# Patient Record
Sex: FEMALE | Race: WHITE | NOT HISPANIC OR LATINO | Employment: FULL TIME | URBAN - METROPOLITAN AREA
[De-identification: names, ages, dates, MRNs, and addresses within clinical notes are randomized per-mention and may not be internally consistent; named-entity substitution may affect disease eponyms.]

---

## 2022-01-07 ENCOUNTER — HOSPITAL ENCOUNTER (EMERGENCY)
Facility: HOSPITAL | Age: 22
Discharge: HOME | End: 2022-01-08
Attending: EMERGENCY MEDICINE | Admitting: EMERGENCY MEDICINE
Payer: COMMERCIAL

## 2022-01-07 DIAGNOSIS — T50.Z95A VACCINE REACTION, INITIAL ENCOUNTER: ICD-10-CM

## 2022-01-07 DIAGNOSIS — F41.0 PANIC ATTACK: ICD-10-CM

## 2022-01-07 LAB
BASOPHILS # BLD AUTO: 0 10E3/UL (ref 0–0.2)
BASOPHILS NFR BLD AUTO: 0 %
EOSINOPHIL # BLD AUTO: 0 10E3/UL (ref 0–0.7)
EOSINOPHIL NFR BLD AUTO: 0 %
ERYTHROCYTE [DISTWIDTH] IN BLOOD BY AUTOMATED COUNT: 12.3 % (ref 10–15)
HCT VFR BLD AUTO: 36.9 % (ref 35–47)
HGB BLD-MCNC: 12.2 G/DL (ref 11.7–15.7)
IMM GRANULOCYTES # BLD: 0 10E3/UL
IMM GRANULOCYTES NFR BLD: 0 %
LYMPHOCYTES # BLD AUTO: 0.6 10E3/UL (ref 0.8–5.3)
LYMPHOCYTES NFR BLD AUTO: 5 %
MCH RBC QN AUTO: 29.3 PG (ref 26.5–33)
MCHC RBC AUTO-ENTMCNC: 33.1 G/DL (ref 31.5–36.5)
MCV RBC AUTO: 89 FL (ref 78–100)
MONOCYTES # BLD AUTO: 0.5 10E3/UL (ref 0–1.3)
MONOCYTES NFR BLD AUTO: 5 %
NEUTROPHILS # BLD AUTO: 9.8 10E3/UL (ref 1.6–8.3)
NEUTROPHILS NFR BLD AUTO: 90 %
NRBC # BLD AUTO: 0 10E3/UL
NRBC BLD AUTO-RTO: 0 /100
PLATELET # BLD AUTO: 260 10E3/UL (ref 150–450)
RBC # BLD AUTO: 4.17 10E6/UL (ref 3.8–5.2)
TROPONIN I SERPL-MCNC: <0.01 NG/ML (ref 0–0.29)
WBC # BLD AUTO: 10.9 10E3/UL (ref 4–11)

## 2022-01-07 PROCEDURE — 85025 COMPLETE CBC W/AUTO DIFF WBC: CPT | Performed by: PHYSICIAN ASSISTANT

## 2022-01-07 PROCEDURE — 93005 ELECTROCARDIOGRAM TRACING: CPT | Performed by: PHYSICIAN ASSISTANT

## 2022-01-07 PROCEDURE — 250N000013 HC RX MED GY IP 250 OP 250 PS 637: Performed by: PHYSICIAN ASSISTANT

## 2022-01-07 PROCEDURE — 36415 COLL VENOUS BLD VENIPUNCTURE: CPT | Performed by: PHYSICIAN ASSISTANT

## 2022-01-07 PROCEDURE — 84484 ASSAY OF TROPONIN QUANT: CPT | Performed by: PHYSICIAN ASSISTANT

## 2022-01-07 PROCEDURE — 84703 CHORIONIC GONADOTROPIN ASSAY: CPT | Performed by: PHYSICIAN ASSISTANT

## 2022-01-07 PROCEDURE — 99285 EMERGENCY DEPT VISIT HI MDM: CPT | Mod: 25

## 2022-01-07 PROCEDURE — 85379 FIBRIN DEGRADATION QUANT: CPT | Performed by: PHYSICIAN ASSISTANT

## 2022-01-07 RX ORDER — HYDROXYZINE HYDROCHLORIDE 25 MG/1
50 TABLET, FILM COATED ORAL ONCE
Status: COMPLETED | OUTPATIENT
Start: 2022-01-07 | End: 2022-01-07

## 2022-01-07 RX ADMIN — HYDROXYZINE HYDROCHLORIDE 50 MG: 25 TABLET, FILM COATED ORAL at 22:59

## 2022-01-07 ASSESSMENT — ENCOUNTER SYMPTOMS
GASTROINTESTINAL NEGATIVE: 1
MUSCULOSKELETAL NEGATIVE: 1
ENDOCRINE NEGATIVE: 1
SHORTNESS OF BREATH: 1
HEMATOLOGIC/LYMPHATIC NEGATIVE: 1
FATIGUE: 1

## 2022-01-07 NOTE — Clinical Note
Kim Tang was seen and treated in our emergency department on 1/7/2022.  She may return to work on 01/17/2022.  Seen in the emergency department.     If you have any questions or concerns, please don't hesitate to call.      Ev Hernandez MD

## 2022-01-08 ENCOUNTER — APPOINTMENT (OUTPATIENT)
Dept: CT IMAGING | Facility: HOSPITAL | Age: 22
End: 2022-01-08
Payer: COMMERCIAL

## 2022-01-08 VITALS
WEIGHT: 98 LBS | SYSTOLIC BLOOD PRESSURE: 101 MMHG | RESPIRATION RATE: 18 BRPM | DIASTOLIC BLOOD PRESSURE: 51 MMHG | OXYGEN SATURATION: 97 % | HEART RATE: 110 BPM | TEMPERATURE: 101.9 F

## 2022-01-08 LAB
ANION GAP SERPL CALCULATED.3IONS-SCNC: 11 MMOL/L (ref 5–18)
ATRIAL RATE - MUSE: 120 BPM
BUN SERPL-MCNC: 12 MG/DL (ref 8–22)
CALCIUM SERPL-MCNC: 9.1 MG/DL (ref 8.5–10.5)
CHLORIDE BLD-SCNC: 104 MMOL/L (ref 98–107)
CO2 SERPL-SCNC: 19 MMOL/L (ref 22–31)
CREAT SERPL-MCNC: 0.75 MG/DL (ref 0.6–1.1)
D DIMER PPP FEU-MCNC: 0.99 UG/ML FEU (ref 0–0.5)
DIASTOLIC BLOOD PRESSURE - MUSE: NORMAL MMHG
GFR SERPL CREATININE-BSD FRML MDRD: >90 ML/MIN/1.73M2
GLUCOSE BLD-MCNC: 100 MG/DL (ref 70–125)
HCG SERPL QL: NEGATIVE
INTERPRETATION ECG - MUSE: NORMAL
P AXIS - MUSE: NORMAL DEGREES
POTASSIUM BLD-SCNC: 3.4 MMOL/L (ref 3.5–5)
PR INTERVAL - MUSE: NORMAL MS
QRS DURATION - MUSE: 78 MS
QT - MUSE: 470 MS
QTC - MUSE: 650 MS
R AXIS - MUSE: -60 DEGREES
SODIUM SERPL-SCNC: 134 MMOL/L (ref 136–145)
SYSTOLIC BLOOD PRESSURE - MUSE: NORMAL MMHG
T AXIS - MUSE: -16 DEGREES
VENTRICULAR RATE- MUSE: 115 BPM

## 2022-01-08 PROCEDURE — 80048 BASIC METABOLIC PNL TOTAL CA: CPT | Performed by: PHYSICIAN ASSISTANT

## 2022-01-08 PROCEDURE — 250N000011 HC RX IP 250 OP 636: Performed by: EMERGENCY MEDICINE

## 2022-01-08 PROCEDURE — 250N000013 HC RX MED GY IP 250 OP 250 PS 637: Performed by: EMERGENCY MEDICINE

## 2022-01-08 PROCEDURE — 36415 COLL VENOUS BLD VENIPUNCTURE: CPT | Performed by: PHYSICIAN ASSISTANT

## 2022-01-08 PROCEDURE — 71275 CT ANGIOGRAPHY CHEST: CPT

## 2022-01-08 RX ORDER — IOPAMIDOL 755 MG/ML
100 INJECTION, SOLUTION INTRAVASCULAR ONCE
Status: DISCONTINUED | OUTPATIENT
Start: 2022-01-08 | End: 2022-01-08 | Stop reason: CLARIF

## 2022-01-08 RX ORDER — ACETAMINOPHEN 650 MG/1
650 SUPPOSITORY RECTAL ONCE
Status: DISCONTINUED | OUTPATIENT
Start: 2022-01-08 | End: 2022-01-08 | Stop reason: ALTCHOICE

## 2022-01-08 RX ORDER — ACETAMINOPHEN 325 MG/1
650 TABLET ORAL ONCE
Status: COMPLETED | OUTPATIENT
Start: 2022-01-08 | End: 2022-01-08

## 2022-01-08 RX ORDER — IOPAMIDOL 755 MG/ML
75 INJECTION, SOLUTION INTRAVASCULAR ONCE
Status: COMPLETED | OUTPATIENT
Start: 2022-01-08 | End: 2022-01-08

## 2022-01-08 RX ADMIN — IOPAMIDOL 75 ML: 755 INJECTION, SOLUTION INTRAVENOUS at 00:35

## 2022-01-08 RX ADMIN — ACETAMINOPHEN 650 MG: 325 TABLET ORAL at 01:26

## 2022-01-08 NOTE — ED NOTES
Patient noted to have a fever at discharge vitals, given Tylenol prior to discharge. Gave instructions on home dosing of Tylenol and ibuprofen for fever as needed.

## 2022-01-08 NOTE — ED PROVIDER NOTES
EMERGENCY DEPARTMENT SIGN OUT NOTE        ED COURSE AND MEDICAL DECISION MAKING  Patient was signed out to me by Sanchez Andrews PA-C at 12:17 AM    In brief, Kim Tang is a 21 year old female who presents to this ED for evaluation of acute onset of chest pain shortness of breath.  Symptoms began around 8 and 9 PM.  Gradually.  Pain is localized the left side of the sternum.  Described as a burning intermittent pain in nature.  Symptoms seem to cause shortness of breath.  No reports of cough, fever, chills.  No previous history of blood clots or clotting disorders.  She is a non-smoker and does not use birth control.  Patient did receive the J&J vaccine for COVID-19 this morning around 8 AM.    At time of sign out, disposition was pending CT Chest Pulmonary Embolism.    CT scan here without acute abnormalities.  Will discharge with follow-up with primary care.    FINAL IMPRESSION    1. Vaccine reaction, initial encounter    2. Panic attack        ED MEDS  Medications   hydrOXYzine (ATARAX) tablet 50 mg (50 mg Oral Given 1/7/22 6081)   iopamidol (ISOVUE-370) solution 75 mL (75 mLs Intravenous Given 1/8/22 0035)       LAB  Labs Ordered and Resulted from Time of ED Arrival to Time of ED Departure   D DIMER QUANTITATIVE - Abnormal       Result Value    D-Dimer Quantitative 0.99 (*)    BASIC METABOLIC PANEL - Abnormal    Sodium 134 (*)     Potassium 3.4 (*)     Chloride 104      Carbon Dioxide (CO2) 19 (*)     Anion Gap 11      Urea Nitrogen 12      Creatinine 0.75      Calcium 9.1      Glucose 100      GFR Estimate >90     CBC WITH PLATELETS AND DIFFERENTIAL - Abnormal    WBC Count 10.9      RBC Count 4.17      Hemoglobin 12.2      Hematocrit 36.9      MCV 89      MCH 29.3      MCHC 33.1      RDW 12.3      Platelet Count 260      % Neutrophils 90      % Lymphocytes 5      % Monocytes 5      % Eosinophils 0      % Basophils 0      % Immature Granulocytes 0      NRBCs per 100 WBC 0      Absolute Neutrophils 9.8 (*)      Absolute Lymphocytes 0.6 (*)     Absolute Monocytes 0.5      Absolute Eosinophils 0.0      Absolute Basophils 0.0      Absolute Immature Granulocytes 0.0      Absolute NRBCs 0.0     HCG QUALITATIVE PREGNANCY - Normal    hCG Serum Qualitative Negative     TROPONIN I - Normal    Troponin I <0.01         EKG      RADIOLOGY    CT Chest Pulmonary Embolism w Contrast   Final Result   IMPRESSION:   1.  There is no pulmonary embolus, aortic aneurysm or dissection. No acute abnormality.   2.  Two small indeterminate left lower lobe lung nodules.      Recommendations for one or multiple incidental lung nodules < 6mm :     Low risk patients: No routine follow-up.     High risk patients: Optional follow-up CT at 12 months; if unchanged, no further follow-up.      *Low Risk: Minimal or absent history of smoking or other known risk factors.   *Nonsolid (ground glass) or partly solid nodules may require longer follow-up to exclude indolent adenocarcinoma.   *Recommendations based on Guidelines for the Management of Incidental Pulmonary Nodules Detected at CT: From the Fleischner Society 2017, Radiology 2017.             DISCHARGE MEDS  New Prescriptions    No medications on file         Urszula Hernandez M.D.  Emergency Medicine  [facility] EMERGENCY DEPARTMENT  [facility] [facility] [address]  [phone]  Dept: [phone]     Ev Hernandez MD  01/08/22 0110

## 2022-01-08 NOTE — DISCHARGE INSTRUCTIONS
Thankfully your CT scan here did not show any acute abnormalities.  Please follow-up with your primary care.  Rest over the rest of the weekend, return for any new or worsening symptoms.

## 2022-01-08 NOTE — ED PROVIDER NOTES
EMERGENCY DEPARTMENT ENCOUNTER      NAME: Kim Tang  AGE: 21 year old female  YOB: 2000  MRN: [id number]  EVALUATION DATE & TIME: No admission date for patient encounter.    PCP: No primary care provider on file.    ED PROVIDER: Sanchez Andrews PA-C      Chief Complaint   Patient presents with     Chest Pain         FINAL IMPRESSION:  1. Vaccine reaction, initial encounter    2. Panic attack          MEDICAL DECISION MAKING:    Pertinent Labs & Imaging studies reviewed. (See chart for details)  21 year old female presents to the Emergency Department for evaluation of chest pain shortness of breath in the presence of having J&J COVID-19 vaccine this morning.    After obtaining history present illness, reviewing vitals and examining the patient plan to assess with labs which will include D-dimer and troponin, EKG, and some form of chest imaging based on dimer results.  Patient does seem like she is hyperventilating and quite anxious at this point time therefore I will treat with Vistaril and observe for improvement of her numbness and tingling as well as heart rate.    Patient was reassessed.  She is feeling improved less anxious, she is sleepy from the Vistaril.  Patient's D-dimer did come back just under one therefore we will further assess with CT scan of the chest.  Remainder of labs are unremarkable.    Patient signed out to Dr. Hernandez, with CT chest results and disposition pending.    ED COURSE    I met with the patient, obtained history, performed an initial exam, and discussed options and plan for diagnostics and treatment here in the ED.    At the conclusion of the encounter I discussed the results of all of the tests and the disposition. The questions were answered. The patient or family acknowledged understanding and was agreeable with the care plan.     MEDICATIONS GIVEN IN THE EMERGENCY:  Medications   hydrOXYzine (ATARAX) tablet 50 mg (50 mg Oral Given 1/7/22 9326)       NEW  PRESCRIPTIONS STARTED AT TODAY'S ER VISIT  New Prescriptions    No medications on file            =================================================================    HPI    Patient information was obtained from: Patient        Kim Tang is a 21 year old female who presents to this ED for evaluation of acute onset of chest pain shortness of breath.  Symptoms began around 8 and 9 PM.  Gradually.  Pain is localized the left side of the sternum.  Described as a burning intermittent pain in nature.  Symptoms seem to cause shortness of breath.  No reports of cough, fever, chills.  No previous history of blood clots or clotting disorders.  She is a non-smoker and does not use birth control.  Patient did receive the J&J vaccine for COVID-19 this morning around 8 AM.    Patient currently reporting numbness and tingling in legs feet hands and around the mouth.  No previous history of panic attack.  Otherwise no reports of past medical history that is significant.      REVIEW OF SYSTEMS   Review of Systems   Constitutional: Positive for fatigue.   HENT: Negative.    Respiratory: Positive for shortness of breath.    Cardiovascular: Positive for chest pain.   Gastrointestinal: Negative.    Endocrine: Negative.    Genitourinary: Negative.    Musculoskeletal: Negative.    Neurological:        Numbness tingling in hands and feet   Hematological: Negative.           PAST MEDICAL HISTORY:  No past medical history on file.    PAST SURGICAL HISTORY:  No past surgical history on file.      CURRENT MEDICATIONS:    No current facility-administered medications for this encounter.  No current outpatient medications on file.      ALLERGIES:  No Known Allergies    FAMILY HISTORY:  No family history on file.    SOCIAL HISTORY:   Social History     Socioeconomic History     Marital status: Single     Spouse name: Not on file     Number of children: Not on file     Years of education: Not on file     Highest education level: Not on file    Occupational History     Not on file   Tobacco Use     Smoking status: Not on file     Smokeless tobacco: Not on file   Substance and Sexual Activity     Alcohol use: Not on file     Drug use: Not on file     Sexual activity: Not on file   Other Topics Concern     Not on file   Social History Narrative     Not on file     Social Determinants of Health     Financial Resource Strain: Not on file   Food Insecurity: Not on file   Transportation Needs: Not on file   Physical Activity: Not on file   Stress: Not on file   Social Connections: Not on file   Intimate Partner Violence: Not on file   Housing Stability: Not on file       VITALS:  Patient Vitals for the past 24 hrs:   BP Temp Temp src Pulse Resp SpO2 Weight   01/07/22 2223 -- -- -- -- -- -- 44.5 kg (98 lb)   01/07/22 2219 123/73 97.5  F (36.4  C) Oral 110 18 100 % --       PHYSICAL EXAM    Physical Exam  Vitals and nursing note reviewed.   Constitutional:       General: She is in acute distress.      Appearance: She is normal weight. She is not ill-appearing or toxic-appearing.   HENT:      Head: Normocephalic.      Right Ear: External ear normal.      Left Ear: External ear normal.   Eyes:      Conjunctiva/sclera: Conjunctivae normal.   Cardiovascular:      Rate and Rhythm: Tachycardia present.      Pulses: Normal pulses.   Pulmonary:      Effort: Pulmonary effort is normal. No respiratory distress.      Breath sounds: No stridor. No wheezing or rhonchi.   Abdominal:      General: Abdomen is flat.      Tenderness: There is no abdominal tenderness.   Musculoskeletal:         General: Normal range of motion.   Skin:     General: Skin is warm and dry.   Neurological:      General: No focal deficit present.      Mental Status: She is alert. Mental status is at baseline.      Sensory: No sensory deficit.      Motor: No weakness.   Psychiatric:         Mood and Affect: Mood normal.          LAB:  All pertinent labs reviewed and interpreted.  Results for orders  placed or performed during the hospital encounter of 01/07/22   D dimer quantitative   Result Value Ref Range    D-Dimer Quantitative 0.99 (H) 0.00 - 0.50 ug/mL FEU   HCG QUALitative pregnancy (blood)   Result Value Ref Range    hCG Serum Qualitative Negative Negative   Troponin I (now)   Result Value Ref Range    Troponin I <0.01 0.00 - 0.29 ng/mL   CBC with platelets and differential   Result Value Ref Range    WBC Count 10.9 4.0 - 11.0 10e3/uL    RBC Count 4.17 3.80 - 5.20 10e6/uL    Hemoglobin 12.2 11.7 - 15.7 g/dL    Hematocrit 36.9 35.0 - 47.0 %    MCV 89 78 - 100 fL    MCH 29.3 26.5 - 33.0 pg    MCHC 33.1 31.5 - 36.5 g/dL    RDW 12.3 10.0 - 15.0 %    Platelet Count 260 150 - 450 10e3/uL    % Neutrophils 90 %    % Lymphocytes 5 %    % Monocytes 5 %    % Eosinophils 0 %    % Basophils 0 %    % Immature Granulocytes 0 %    NRBCs per 100 WBC 0 <1 /100    Absolute Neutrophils 9.8 (H) 1.6 - 8.3 10e3/uL    Absolute Lymphocytes 0.6 (L) 0.8 - 5.3 10e3/uL    Absolute Monocytes 0.5 0.0 - 1.3 10e3/uL    Absolute Eosinophils 0.0 0.0 - 0.7 10e3/uL    Absolute Basophils 0.0 0.0 - 0.2 10e3/uL    Absolute Immature Granulocytes 0.0 <=0.4 10e3/uL    Absolute NRBCs 0.0 10e3/uL       RADIOLOGY:  Reviewed all pertinent imaging. Please see official radiology report.  CT Chest Pulmonary Embolism w Contrast    (Results Pending)       EKG:    Performed at: 2229    EKG showing a sinus rhythm at a rate of 115 bpm.  ST segments normal with no obvious elevation or depression.  T waves appear flat.  QTC measured at 650.,  No previous EKG for comparison.    I have independently reviewed and interpreted the EKG(s) documented above.  Reviewed with Dr. СЕРГЕЙ Andrews PA-C  Emergency Medicine  [facility]     Sanchez Andrews PA-C  01/08/22 0017

## 2022-01-08 NOTE — ED TRIAGE NOTES
Pt comes in with chest pains. Pain started around 2045 this evening, pt has J&J vaccine this morning. Pt appears anxious. Some shoot pain down both legs.

## 2022-05-14 ENCOUNTER — HEALTH MAINTENANCE LETTER (OUTPATIENT)
Age: 22
End: 2022-05-14

## 2023-01-14 ENCOUNTER — HEALTH MAINTENANCE LETTER (OUTPATIENT)
Age: 23
End: 2023-01-14

## 2023-06-02 ENCOUNTER — HEALTH MAINTENANCE LETTER (OUTPATIENT)
Age: 23
End: 2023-06-02

## 2024-07-06 ENCOUNTER — HEALTH MAINTENANCE LETTER (OUTPATIENT)
Age: 24
End: 2024-07-06

## 2024-10-29 ENCOUNTER — TELEPHONE (OUTPATIENT)
Dept: OBGYN | Facility: CLINIC | Age: 24
End: 2024-10-29

## 2024-10-29 ENCOUNTER — APPOINTMENT (OUTPATIENT)
Dept: CT IMAGING | Facility: HOSPITAL | Age: 24
End: 2024-10-29
Payer: COMMERCIAL

## 2024-10-29 ENCOUNTER — HOSPITAL ENCOUNTER (EMERGENCY)
Facility: HOSPITAL | Age: 24
Discharge: HOME | End: 2024-10-29
Attending: STUDENT IN AN ORGANIZED HEALTH CARE EDUCATION/TRAINING PROGRAM | Admitting: STUDENT IN AN ORGANIZED HEALTH CARE EDUCATION/TRAINING PROGRAM
Payer: COMMERCIAL

## 2024-10-29 VITALS
BODY MASS INDEX: 14.32 KG/M2 | HEART RATE: 68 BPM | OXYGEN SATURATION: 99 % | HEIGHT: 70 IN | TEMPERATURE: 98.4 F | DIASTOLIC BLOOD PRESSURE: 64 MMHG | SYSTOLIC BLOOD PRESSURE: 97 MMHG | RESPIRATION RATE: 15 BRPM | WEIGHT: 100 LBS

## 2024-10-29 DIAGNOSIS — R10.31 RIGHT LOWER QUADRANT ABDOMINAL PAIN: ICD-10-CM

## 2024-10-29 LAB
ALBUMIN SERPL BCG-MCNC: 4.9 G/DL (ref 3.5–5.2)
ALBUMIN UR-MCNC: 10 MG/DL
ALP SERPL-CCNC: 70 U/L (ref 40–150)
ALT SERPL W P-5'-P-CCNC: 17 U/L (ref 0–50)
ANION GAP SERPL CALCULATED.3IONS-SCNC: 13 MMOL/L (ref 7–15)
APPEARANCE UR: CLEAR
AST SERPL W P-5'-P-CCNC: 17 U/L (ref 0–45)
BASOPHILS # BLD AUTO: 0 10E3/UL (ref 0–0.2)
BASOPHILS NFR BLD AUTO: 1 %
BILIRUB DIRECT SERPL-MCNC: <0.2 MG/DL (ref 0–0.3)
BILIRUB SERPL-MCNC: 0.3 MG/DL
BILIRUB UR QL STRIP: NEGATIVE
BUN SERPL-MCNC: 13.5 MG/DL (ref 6–20)
CALCIUM SERPL-MCNC: 9.4 MG/DL (ref 8.8–10.4)
CHLORIDE SERPL-SCNC: 106 MMOL/L (ref 98–107)
COLOR UR AUTO: ABNORMAL
CREAT SERPL-MCNC: 0.66 MG/DL (ref 0.51–0.95)
EGFRCR SERPLBLD CKD-EPI 2021: >90 ML/MIN/1.73M2
EOSINOPHIL # BLD AUTO: 0 10E3/UL (ref 0–0.7)
EOSINOPHIL NFR BLD AUTO: 0 %
ERYTHROCYTE [DISTWIDTH] IN BLOOD BY AUTOMATED COUNT: 12.8 % (ref 10–15)
GLUCOSE SERPL-MCNC: 93 MG/DL (ref 70–99)
GLUCOSE UR STRIP-MCNC: NEGATIVE MG/DL
HCG SERPL QL: NEGATIVE
HCO3 SERPL-SCNC: 21 MMOL/L (ref 22–29)
HCT VFR BLD AUTO: 39.9 % (ref 35–47)
HGB BLD-MCNC: 13.3 G/DL (ref 11.7–15.7)
HGB UR QL STRIP: NEGATIVE
IMM GRANULOCYTES # BLD: 0 10E3/UL
IMM GRANULOCYTES NFR BLD: 0 %
KETONES UR STRIP-MCNC: 20 MG/DL
LEUKOCYTE ESTERASE UR QL STRIP: NEGATIVE
LIPASE SERPL-CCNC: 56 U/L (ref 13–60)
LYMPHOCYTES # BLD AUTO: 2.6 10E3/UL (ref 0.8–5.3)
LYMPHOCYTES NFR BLD AUTO: 33 %
MCH RBC QN AUTO: 29.2 PG (ref 26.5–33)
MCHC RBC AUTO-ENTMCNC: 33.3 G/DL (ref 31.5–36.5)
MCV RBC AUTO: 88 FL (ref 78–100)
MONOCYTES # BLD AUTO: 0.5 10E3/UL (ref 0–1.3)
MONOCYTES NFR BLD AUTO: 6 %
MUCOUS THREADS #/AREA URNS LPF: PRESENT /LPF
NEUTROPHILS # BLD AUTO: 4.7 10E3/UL (ref 1.6–8.3)
NEUTROPHILS NFR BLD AUTO: 60 %
NITRATE UR QL: NEGATIVE
NRBC # BLD AUTO: 0 10E3/UL
NRBC BLD AUTO-RTO: 0 /100
PH UR STRIP: 6 [PH] (ref 5–7)
PLATELET # BLD AUTO: 290 10E3/UL (ref 150–450)
POTASSIUM SERPL-SCNC: 4.1 MMOL/L (ref 3.4–5.3)
PROT SERPL-MCNC: 7.6 G/DL (ref 6.4–8.3)
RBC # BLD AUTO: 4.55 10E6/UL (ref 3.8–5.2)
RBC URINE: 1 /HPF
SODIUM SERPL-SCNC: 140 MMOL/L (ref 135–145)
SP GR UR STRIP: 1.03 (ref 1–1.03)
SQUAMOUS EPITHELIAL: 1 /HPF
UROBILINOGEN UR STRIP-MCNC: <2 MG/DL
WBC # BLD AUTO: 7.8 10E3/UL (ref 4–11)
WBC URINE: <1 /HPF

## 2024-10-29 PROCEDURE — 85004 AUTOMATED DIFF WBC COUNT: CPT

## 2024-10-29 PROCEDURE — 250N000011 HC RX IP 250 OP 636

## 2024-10-29 PROCEDURE — 81003 URINALYSIS AUTO W/O SCOPE: CPT

## 2024-10-29 PROCEDURE — 96374 THER/PROPH/DIAG INJ IV PUSH: CPT | Mod: 59

## 2024-10-29 PROCEDURE — 36415 COLL VENOUS BLD VENIPUNCTURE: CPT

## 2024-10-29 PROCEDURE — 99285 EMERGENCY DEPT VISIT HI MDM: CPT | Mod: 25

## 2024-10-29 PROCEDURE — 96375 TX/PRO/DX INJ NEW DRUG ADDON: CPT

## 2024-10-29 PROCEDURE — 74177 CT ABD & PELVIS W/CONTRAST: CPT

## 2024-10-29 PROCEDURE — 82248 BILIRUBIN DIRECT: CPT

## 2024-10-29 PROCEDURE — 84703 CHORIONIC GONADOTROPIN ASSAY: CPT

## 2024-10-29 PROCEDURE — 83690 ASSAY OF LIPASE: CPT

## 2024-10-29 RX ORDER — ONDANSETRON 2 MG/ML
4 INJECTION INTRAMUSCULAR; INTRAVENOUS EVERY 30 MIN PRN
Status: DISCONTINUED | OUTPATIENT
Start: 2024-10-29 | End: 2024-10-29 | Stop reason: HOSPADM

## 2024-10-29 RX ORDER — IOPAMIDOL 755 MG/ML
60 INJECTION, SOLUTION INTRAVASCULAR ONCE
Status: COMPLETED | OUTPATIENT
Start: 2024-10-29 | End: 2024-10-29

## 2024-10-29 RX ORDER — KETOROLAC TROMETHAMINE 15 MG/ML
10 INJECTION, SOLUTION INTRAMUSCULAR; INTRAVENOUS ONCE
Status: COMPLETED | OUTPATIENT
Start: 2024-10-29 | End: 2024-10-29

## 2024-10-29 RX ADMIN — IOPAMIDOL 60 ML: 755 INJECTION, SOLUTION INTRAVENOUS at 13:35

## 2024-10-29 RX ADMIN — ONDANSETRON 4 MG: 2 INJECTION INTRAMUSCULAR; INTRAVENOUS at 12:30

## 2024-10-29 RX ADMIN — KETOROLAC TROMETHAMINE 10 MG: 15 INJECTION, SOLUTION INTRAMUSCULAR; INTRAVENOUS at 12:33

## 2024-10-29 ASSESSMENT — COLUMBIA-SUICIDE SEVERITY RATING SCALE - C-SSRS
2. HAVE YOU ACTUALLY HAD ANY THOUGHTS OF KILLING YOURSELF IN THE PAST MONTH?: NO
6. HAVE YOU EVER DONE ANYTHING, STARTED TO DO ANYTHING, OR PREPARED TO DO ANYTHING TO END YOUR LIFE?: NO
1. IN THE PAST MONTH, HAVE YOU WISHED YOU WERE DEAD OR WISHED YOU COULD GO TO SLEEP AND NOT WAKE UP?: NO

## 2024-10-29 ASSESSMENT — ACTIVITIES OF DAILY LIVING (ADL)
ADLS_ACUITY_SCORE: 0

## 2024-10-29 NOTE — ED PROVIDER NOTES
Emergency Department Encounter   NAME: Kim Tang ; AGE: 24 year old female ; YOB: 2000 ; MRN: [id number] ; PCP: No Ref-Primary, Physician   ED PROVIDER: Saray Murguia PA-C    Evaluation Date & Time:   10/29/2024 11:06 AM    CHIEF COMPLAINT:  Abdominal Pain      FINAL IMPRESSION:    ICD-10-CM    1. Right lower quadrant abdominal pain  R10.31 Ob/Gyn  Referral            IMPRESSION AND PLAN   MDM: Kim Tang is a 24 year old female with no known medical history who presents to the ED by walk-in for evaluation of RLQ pain that has been progressively worsening over the last 10 days. LMP 10/25/24. No history of abdominal surgeries.     Vitals stable. On exam patient is well-appearing an in no acute distress.  Abdomen is diffusely rigid and tender, most notably in the RUQ/RLQ/LLQ.  Mild suprapubic tenderness noted.  Tenderness over McBurney's point.  No rebound tenderness, no Rovsing sign.  Positive CVA tenderness on the right side, negative on the left side.  Differentials include appendicitis, diverticulitis, pyelonephritis, nephrolithiasis, UTI, ovarian cyst, endometriosis, pregnancy, STD, hepatobiliary etiology, pancreatitis.  Less concern for ovarian torsion given length of symptoms.     Patient given Toradol and Zofran for initial symptom management.  CBC without leukocytosis, hemoglobin stable.  BMP, hepatic function test both unremarkable.  Lipase at 56, not concerning for pancreatitis.  Pregnancy test negative.  Patient declines any changes in vaginal discharge, do not feel wet prep is necessary at this time.  UA without concerns for infectious etiology.  CT abdomen without evidence of acute appendicitis, no appreciable stones, hydronephrosis, gallstones to suggest cause of symptoms.  CT did note of small amount of free fluid in the pelvis which could be indicative of a ruptured ovarian cyst, since there is no other clear etiology to patient's symptoms.      Upon  reevaluation, patient is resting comfortably in her hospital bed and remains vitally stable.  She reports no change in symptoms with Toradol and Zofran.  Patient was informed of unremarkable blood work.  I also discussed with her that there was free fluid in her pelvic area found on the CT which could be signs of a ruptured ovarian cyst.  There is no treatment for this acutely as her symptoms will subside on her own.  Mom did inquire about endometriosis which is on my differential.  I highly recommended OB/GYN referral for additional evaluation and follow-up to which both mom and patient are amenable.  In the meantime, I recommended NSAIDs and heating pad to the area.  Patient is agreeable with this plan and feels comfortable discharge at this time.    Patient seen in conjunction with Dr. Johnson.    History:  Supplemental history from: Family Member/Significant Other  External Record(s) reviewed: Documented in chart    Work Up:  Chart documentation includes differential considered and any EKGs or imaging independently interpreted by provider, where specified.  In additional to work up documented, I considered the following work up: Documented in chart, if applicable.    External consultation:  Discussion of management with another provider: Documented in chart, if applicable    Complicating factors:  Care impacted by chronic illness: See HPI.  Care affected by social determinants of health: N/A    Disposition considerations: Discharge. No recommendations on prescription strength medication(s). See documentation for any additional details.  Not Applicable      Chart Review: See HPI.    ED COURSE:  12:11 PM  I met and introduced myself to the patient. I gathered initial history and performed my physical exam. We discussed plan for initial workup.   2:26 PM I have staffed the patient with Dr. Johnson, ED MD, who has evaluated the patient and agrees with all aspects of today's care.   2:43 PM I rechecked the patient and  discussed results, discharge, follow up, and reasons to return to the ED.           MEDICATIONS GIVEN IN THE EMERGENCY DEPARTMENT:  Medications   ketorolac (TORADOL) injection 10 mg (10 mg Intravenous $Given 10/29/24 5723)   iopamidol (ISOVUE-370) solution 60 mL (60 mLs Intravenous $Given 10/29/24 1335)         NEW PRESCRIPTIONS STARTED AT TODAY'S ED VISIT:  There are no discharge medications for this patient.        BRIEF HPI   Patient information was obtained from: Patient and Mother   Use of Intrepreter: N/A     Kim Tang is a 24 year old female who presents emergency department with RLQ pain.  Patient reports the pain began 10 days ago and has been gradually worsening since.  She describes the pain as sharp and crampy, similar to period cramps.  LMP 10/25/2024.  She does not currently use birth control, no IUD.  She has not taken any medications to manage her pain.  She was seen at urgent care earlier today.  Based on her exquisitely tender abdomen that was rigid to palpation, patient was sent immediately to the emergency department.  CBC was drawn at urgent care, no other labs ordered.  No imaging or urine ordered at urgent care either.  Patient also endorses some mild chills, nausea.  She otherwise denies fever, vomiting, urinary symptoms, diarrhea, constipation, change in vaginal discharge.  She has no concern for pregnancy or STDs at this time.    Patient reports last oral intake was this morning when she drinks water, no food as of yet today.  She also did have a bowel movement this morning which appeared normal, no hematochezia, melena or pain with defecation.  Patient has no history of abdominal surgeries.      REVIEW OF SYSTEMS:  Pertinent positive and negative symptoms per HPI.       MEDICAL HISTORY     No past medical history on file.    No past surgical history on file.    No family history on file.         No current outpatient medications on file.        PHYSICAL EXAM     First  "Vitals:  Patient Vitals for the past 24 hrs:   BP Temp Temp src Pulse Resp SpO2 Height Weight   10/29/24 1455 97/64 -- -- 68 15 99 % -- --   10/29/24 1431 102/68 -- -- 69 -- 99 % -- --   10/29/24 1402 -- -- -- 85 -- 100 % -- --   10/29/24 1337 96/54 -- -- 57 -- 100 % -- --   10/29/24 1300 106/81 -- -- 63 -- 100 % -- --   10/29/24 1237 112/71 -- -- 59 -- 100 % -- --   10/29/24 1140 91/58 98.4  F (36.9  C) -- 64 -- 98 % -- --   10/29/24 1103 104/68 98  F (36.7  C) Temporal 78 12 97 % 1.778 m (5' 10\") 45.4 kg (100 lb)       PHYSICAL EXAM:  Physical Exam  Vitals and nursing note reviewed.   Constitutional:       General: She is not in acute distress.     Appearance: She is well-developed and normal weight. She is not ill-appearing.   Cardiovascular:      Rate and Rhythm: Normal rate.   Pulmonary:      Effort: Pulmonary effort is normal.   Abdominal:      General: Abdomen is flat. There is distension.      Tenderness: There is abdominal tenderness in the right upper quadrant, right lower quadrant, epigastric area and left lower quadrant. There is right CVA tenderness and guarding. There is no left CVA tenderness or rebound. Positive signs include McBurney's sign. Negative signs include Tubbs's sign and Rovsing's sign.   Skin:     General: Skin is warm and dry.   Neurological:      General: No focal deficit present.      Mental Status: She is alert and oriented to person, place, and time.   Psychiatric:         Mood and Affect: Mood normal.          RESULTS     LAB:  All pertinent labs reviewed and interpreted  Labs Ordered and Resulted from Time of ED Arrival to Time of ED Departure   BASIC METABOLIC PANEL - Abnormal       Result Value    Sodium 140      Potassium 4.1      Chloride 106      Carbon Dioxide (CO2) 21 (*)     Anion Gap 13      Urea Nitrogen 13.5      Creatinine 0.66      GFR Estimate >90      Calcium 9.4      Glucose 93     ROUTINE UA WITH MICROSCOPIC REFLEX TO CULTURE - Abnormal    Color Urine Light Yellow "      Appearance Urine Clear      Glucose Urine Negative      Bilirubin Urine Negative      Ketones Urine 20 (*)     Specific Gravity Urine 1.028      Blood Urine Negative      pH Urine 6.0      Protein Albumin Urine 10 (*)     Urobilinogen Urine <2.0      Nitrite Urine Negative      Leukocyte Esterase Urine Negative      Mucus Urine Present (*)     RBC Urine 1      WBC Urine <1      Squamous Epithelials Urine 1     HEPATIC FUNCTION PANEL - Normal    Protein Total 7.6      Albumin 4.9      Bilirubin Total 0.3      Alkaline Phosphatase 70      AST 17      ALT 17      Bilirubin Direct <0.20     LIPASE - Normal    Lipase 56     HCG QUALITATIVE PREGNANCY - Normal    hCG Serum Qualitative Negative     CBC WITH PLATELETS AND DIFFERENTIAL    WBC Count 7.8      RBC Count 4.55      Hemoglobin 13.3      Hematocrit 39.9      MCV 88      MCH 29.2      MCHC 33.3      RDW 12.8      Platelet Count 290      % Neutrophils 60      % Lymphocytes 33      % Monocytes 6      % Eosinophils 0      % Basophils 1      % Immature Granulocytes 0      NRBCs per 100 WBC 0      Absolute Neutrophils 4.7      Absolute Lymphocytes 2.6      Absolute Monocytes 0.5      Absolute Eosinophils 0.0      Absolute Basophils 0.0      Absolute Immature Granulocytes 0.0      Absolute NRBCs 0.0         RADIOLOGY:  CT Abdomen Pelvis w Contrast   Final Result   IMPRESSION:       1.  No appendicitis or other acute abnormality.      2.  No specific cause of abdominal pain is identified.              Saray Murguia PA-C  Emergency Medicine   [facility] EMERGENCY DEPARTMENT       Saray Murguia PA-C  10/29/24 2689

## 2024-10-29 NOTE — TELEPHONE ENCOUNTER
This encounter is being sent to inform the clinic that this patient has a referral from Saray Murguia PA-C for the diagnoses of Right lower quadrant abdominal pain and right ovarian cyst and has requested that this patient be seen within 3-5 days and/or with urgency.  Based on the availability of our provider(s), we are unable to accommodate this request.    Were all sites offered this patient?  Yes    Does scheduling algorithm request to schedule next available?  Patient appointment has not been scheduled.  Please review the referral request for accommodation and contact the patient.  If unable to accommodate, please resubmit a referral and indicate a preferred partner or affiliate location using Provider Finder or Scheduling Instructions field.

## 2024-10-29 NOTE — ED PROVIDER NOTES
"Emergency Department Midlevel Supervisory Note     I had a face to face encounter with this patient seen by the Advanced Practice Provider (RAOUL). I personally made/approved the management plan and take responsibility for the patient management. I personally saw patient and performed a substantive portion of the visit including all aspects of the medical decision making.     ED Course:  2:26 PM Saray Murguia PA-C staffed patient with me. I agree with their assessment and plan of management, and I will see the patient.      Brief HPI:     Kim Tang is a 24 year old female who presents for evaluation of right lower quadrant abdominal pain. The pain (sharp and crampy similar to period cramps) began ten days ago and has worsened.  Began worsening a few days ago although stayed relatively confined to the lower abdomen right lower quadrant.  Some associated nausea without any vomiting.  No fevers.  Denies any hematuria or dysuria.  Does state that she has had a history of severe menstrual cramps but this pain is little bit atypical compared to that.  No prior abdominal surgeries    I, Merrick Huff, am serving as a scribe to document services personally performed by Leonid Johnson MD, based on my observations and the provider's statements to me.   I, Leonid Johnson MD attest that Merrick Huff was acting in a scribe capacity, has observed my performance of the services and has documented them in accordance with my direction.    Brief Physical Exam: BP 96/54   Pulse 57   Temp 98.4  F (36.9  C)   Resp 12   Ht 1.778 m (5' 10\")   Wt 45.4 kg (100 lb)   SpO2 100%   BMI 14.35 kg/m    Constitutional:  Alert, in no acute distress  EYES: Conjunctivae clear  HENT:  Atraumatic  Respiratory:  Respirations even, unlabored, in no acute respiratory distress  Cardiovascular: Warm and well-perfused extremities, borderline bradycardic, regular rhythm  GI: Soft, non-distended, tenderness in the lower abdomen  Musculoskeletal:  " Moves all 4 extremities equally, grossly symmetrical strength  Integument: Warm & dry. No appreciable rash, erythema.  Neurologic:  Alert & oriented, speech clear and fluent, no focal deficits noted  Psych: Normal mood and affect       MDM:  Patient is a 24-year-old female present to the emergency department approximately 10 days of abdominal pain.    Workup here shows reassuring lab workup.  No significant leukocytosis or anemia.  BMP is reassuring as well.  No LFT abnormalities.  Lipase within normal limits.  UA not suggestive of infection     CT ab pelvis shows normal appendix and otherwise no acute intra-abdominal process.  There is trace to small amount of physiologic free fluid in the pelvis which could potentially be related to previously ruptured ovarian cyst although no obvious cyst seen on CT today.    Unclear etiology of her pain today but I have a low suspicion for ovarian torsion given her chronicity of symptoms over the past 10 days and no significant ovarian cyst on CT.  Consider the possibility of ruptured hemorrhagic ovarian cyst or possibly endometriosis.  However, do not believe patient requires further emergent testing or hospitalization this point in time.  Do recommend following up with OB/GYN in the outpatient setting.  Otherwise patient discharged stable condition    1. Right lower quadrant abdominal pain        Consults:      Labs and Imaging:  Results for orders placed or performed during the hospital encounter of 10/29/24   CT Abdomen Pelvis w Contrast    Impression    IMPRESSION:     1.  No appendicitis or other acute abnormality.    2.  No specific cause of abdominal pain is identified.   Basic metabolic panel   Result Value Ref Range    Sodium 140 135 - 145 mmol/L    Potassium 4.1 3.4 - 5.3 mmol/L    Chloride 106 98 - 107 mmol/L    Carbon Dioxide (CO2) 21 (L) 22 - 29 mmol/L    Anion Gap 13 7 - 15 mmol/L    Urea Nitrogen 13.5 6.0 - 20.0 mg/dL    Creatinine 0.66 0.51 - 0.95 mg/dL    GFR  Estimate >90 >60 mL/min/1.73m2    Calcium 9.4 8.8 - 10.4 mg/dL    Glucose 93 70 - 99 mg/dL   Hepatic function panel   Result Value Ref Range    Protein Total 7.6 6.4 - 8.3 g/dL    Albumin 4.9 3.5 - 5.2 g/dL    Bilirubin Total 0.3 <=1.2 mg/dL    Alkaline Phosphatase 70 40 - 150 U/L    AST 17 0 - 45 U/L    ALT 17 0 - 50 U/L    Bilirubin Direct <0.20 0.00 - 0.30 mg/dL   Result Value Ref Range    Lipase 56 13 - 60 U/L   UA with Microscopic reflex to Culture    Specimen: Urine, Clean Catch   Result Value Ref Range    Color Urine Light Yellow Colorless, Straw, Light Yellow, Yellow    Appearance Urine Clear Clear    Glucose Urine Negative Negative mg/dL    Bilirubin Urine Negative Negative    Ketones Urine 20 (A) Negative mg/dL    Specific Gravity Urine 1.028 1.001 - 1.030    Blood Urine Negative Negative    pH Urine 6.0 5.0 - 7.0    Protein Albumin Urine 10 (A) Negative mg/dL    Urobilinogen Urine <2.0 <2.0 mg/dL    Nitrite Urine Negative Negative    Leukocyte Esterase Urine Negative Negative    Mucus Urine Present (A) None Seen /LPF    RBC Urine 1 <=2 /HPF    WBC Urine <1 <=5 /HPF    Squamous Epithelials Urine 1 <=1 /HPF   HCG qualitative Blood   Result Value Ref Range    hCG Serum Qualitative Negative Negative   CBC with platelets and differential   Result Value Ref Range    WBC Count 7.8 4.0 - 11.0 10e3/uL    RBC Count 4.55 3.80 - 5.20 10e6/uL    Hemoglobin 13.3 11.7 - 15.7 g/dL    Hematocrit 39.9 35.0 - 47.0 %    MCV 88 78 - 100 fL    MCH 29.2 26.5 - 33.0 pg    MCHC 33.3 31.5 - 36.5 g/dL    RDW 12.8 10.0 - 15.0 %    Platelet Count 290 150 - 450 10e3/uL    % Neutrophils 60 %    % Lymphocytes 33 %    % Monocytes 6 %    % Eosinophils 0 %    % Basophils 1 %    % Immature Granulocytes 0 %    NRBCs per 100 WBC 0 <1 /100    Absolute Neutrophils 4.7 1.6 - 8.3 10e3/uL    Absolute Lymphocytes 2.6 0.8 - 5.3 10e3/uL    Absolute Monocytes 0.5 0.0 - 1.3 10e3/uL    Absolute Eosinophils 0.0 0.0 - 0.7 10e3/uL    Absolute Basophils 0.0  0.0 - 0.2 10e3/uL    Absolute Immature Granulocytes 0.0 <=0.4 10e3/uL    Absolute NRBCs 0.0 10e3/uL       I have reviewed the relevant laboratory studies above.    I independently interpreted the following imaging study(s):       EKG: I reviewed and independently interpreted the patient's EKG, with comments made as listed below. Please see scanned EKG for full report.       Procedures:  I was present for the key portions of procedures documented in RAOUL/midlevel note, see midlevel note for further details.    Leonid Johnson MD  [facility] EMERGENCY DEPARTMENT  [address] [place] [id number]  [phone]       Leonid Johnson MD  10/29/24 1107

## 2024-10-29 NOTE — ED TRIAGE NOTES
Thr pt c/o RLQ pain that started 10 days ago, denies any trauma. Pt states crossing her right leg over to the left and running worsens the pain. vomiting, or diarrhea. Appetite is good, feels a little nauseated. Denies any urinary sx;s.      Triage Assessment (Adult)       Row Name 10/29/24 1100          Triage Assessment    Airway WDL WDL        Respiratory WDL    Respiratory WDL WDL        Skin Circulation/Temperature WDL    Skin Circulation/Temperature WDL WDL        Cardiac WDL    Cardiac WDL WDL        Peripheral/Neurovascular WDL    Peripheral Neurovascular WDL WDL        Cognitive/Neuro/Behavioral WDL    Cognitive/Neuro/Behavioral WDL WDL

## 2024-10-29 NOTE — TELEPHONE ENCOUNTER
Patient calls back and requests to have appt at [facility] on 10/30/2024. Cancelled appt on 11/7/2024 at [facility].

## 2024-10-29 NOTE — DISCHARGE INSTRUCTIONS
The blood work and imaging here in the ED were grossly unremarkable. There is no evidence of infection, appendicitis, kidney stone, diverticulitis. However, there was a small amount of free fluid noted in the pelvis that could be indicative of a ruptured ovarian cyst. Additionally, if there is concern for endometriosis, OBGYN would be the appropriate follow-up, for which I will provide a referral. They can also discuss further management of your severe menstrual cramping. In the meantime, please use tylenol/ibuprofen and a heating pad as needed for pain. Please return to the ED for new or worsening symptoms.

## 2024-10-30 ENCOUNTER — OFFICE VISIT (OUTPATIENT)
Dept: OBGYN | Facility: CLINIC | Age: 24
End: 2024-10-30
Payer: COMMERCIAL

## 2024-10-30 ENCOUNTER — ANCILLARY PROCEDURE (OUTPATIENT)
Dept: ULTRASOUND IMAGING | Facility: CLINIC | Age: 24
End: 2024-10-30
Attending: OBSTETRICS & GYNECOLOGY
Payer: COMMERCIAL

## 2024-10-30 VITALS
HEIGHT: 60 IN | BODY MASS INDEX: 19.63 KG/M2 | SYSTOLIC BLOOD PRESSURE: 98 MMHG | DIASTOLIC BLOOD PRESSURE: 56 MMHG | WEIGHT: 100 LBS

## 2024-10-30 DIAGNOSIS — Z30.011 ENCOUNTER FOR INITIAL PRESCRIPTION OF CONTRACEPTIVE PILLS: ICD-10-CM

## 2024-10-30 DIAGNOSIS — R10.31 RIGHT LOWER QUADRANT ABDOMINAL PAIN: ICD-10-CM

## 2024-10-30 DIAGNOSIS — N94.6 DYSMENORRHEA: Primary | ICD-10-CM

## 2024-10-30 LAB
CLUE CELLS: ABNORMAL
TRICHOMONAS, WET PREP: ABNORMAL
WBC'S/HIGH POWER FIELD, WET PREP: ABNORMAL
YEAST, WET PREP: ABNORMAL

## 2024-10-30 PROCEDURE — G0145 SCR C/V CYTO,THINLAYER,RESCR: HCPCS | Performed by: OBSTETRICS & GYNECOLOGY

## 2024-10-30 PROCEDURE — 76830 TRANSVAGINAL US NON-OB: CPT | Performed by: OBSTETRICS & GYNECOLOGY

## 2024-10-30 PROCEDURE — 87491 CHLMYD TRACH DNA AMP PROBE: CPT | Performed by: OBSTETRICS & GYNECOLOGY

## 2024-10-30 PROCEDURE — 99204 OFFICE O/P NEW MOD 45 MIN: CPT | Performed by: OBSTETRICS & GYNECOLOGY

## 2024-10-30 PROCEDURE — G0124 SCREEN C/V THIN LAYER BY MD: HCPCS | Performed by: PATHOLOGY

## 2024-10-30 PROCEDURE — 87591 N.GONORRHOEAE DNA AMP PROB: CPT | Performed by: OBSTETRICS & GYNECOLOGY

## 2024-10-30 PROCEDURE — 87210 SMEAR WET MOUNT SALINE/INK: CPT | Performed by: OBSTETRICS & GYNECOLOGY

## 2024-10-30 PROCEDURE — 76856 US EXAM PELVIC COMPLETE: CPT | Performed by: OBSTETRICS & GYNECOLOGY

## 2024-10-30 RX ORDER — NORGESTIMATE AND ETHINYL ESTRADIOL 0.25-0.035
1 KIT ORAL DAILY
Qty: 84 TABLET | Refills: 4 | Status: SHIPPED | OUTPATIENT
Start: 2024-10-30

## 2024-10-30 NOTE — PATIENT INSTRUCTIONS
[facility]   [email or web address]  - medical menopause with no estrogen or progesterone for SE    Myfimbree  - medical menopause with add back E2/P for side effects    Alternative is normal birth control pill which you will likely have fewer side effects    Dysmenorrhea (painful periods)    - Causes of dysmenorrhea include prostaglandin mediated cramping, structural abnormalities (fibroids, polyps, adenomyosis), and endometriosis.   - Scheduled Ibuprofen 600mg every 4 hours or 800mg every 6 hours OR Aleve 2 tabs every 8-12 hours throughout menses and starting the night before bleeding. Additionally, heating pads to the lower abdomen can be helpful.  -  Reviewed options for hormonal birth control, including oral contraceptive pills, patches, vaginal ring, implant, shot, IUD (hormonal), as well as hysteroscopy D&C, endometrial ablation or hysterectomy, if refractory to the above methods.     - Can obtain pelvic US if ongoing painful periods despite the above interventions     Endometriosis:    We discussed the pathophysiology of endometriosis and its relation to pelvic pain. We discussed the role of surgery in diagnosis and treatment, and the necessity for suppressive treatment following surgical treatment as well. Options for suppression include depo Provera, oral progesterones (both as oral contraceptive pills and not), combined hormonal contraception (pills, patch, or ring), Mirena IUD, and then GnRH agonists and antagonists, Orlissa and Depo Lupron. We can meet to discuss these options further at anytime.     Discussed that even with a definitive diagnosis of endometriosis, pelvic pain of a chronic nature frequently is multifactorial in origin, and therefore often requires a multidisciplinary approach for optimal treatment. This may include, in addition to medication and/or surgery, physical therapy, pain clinic, and psychologic treatment. Because this develops over a period of time, there will not  be an immediate recovery with any of these options over the short term. The goal is always to maximize function and minimize symptoms, to the extent that is possible.

## 2024-10-31 LAB
C TRACH DNA SPEC QL NAA+PROBE: NEGATIVE
N GONORRHOEA DNA SPEC QL NAA+PROBE: NEGATIVE

## 2024-11-06 LAB
BKR LAB AP GYN ADEQUACY: ABNORMAL
BKR LAB AP GYN INTERPRETATION: ABNORMAL
BKR LAB AP HPV REFLEX: NO
BKR LAB AP LMP: ABNORMAL
BKR LAB AP PREVIOUS ABNORMAL: ABNORMAL
PATH REPORT.COMMENTS IMP SPEC: ABNORMAL
PATH REPORT.COMMENTS IMP SPEC: ABNORMAL
PATH REPORT.RELEVANT HX SPEC: ABNORMAL

## 2024-11-07 ENCOUNTER — PATIENT OUTREACH (OUTPATIENT)
Dept: OBGYN | Facility: CLINIC | Age: 24
End: 2024-11-07

## 2024-12-18 ENCOUNTER — OFFICE VISIT (OUTPATIENT)
Dept: OBGYN | Facility: CLINIC | Age: 24
End: 2024-12-18
Payer: COMMERCIAL

## 2024-12-18 VITALS — BODY MASS INDEX: 19.9 KG/M2 | DIASTOLIC BLOOD PRESSURE: 66 MMHG | SYSTOLIC BLOOD PRESSURE: 100 MMHG | WEIGHT: 101.9 LBS

## 2024-12-18 DIAGNOSIS — R87.612 LGSIL ON PAP SMEAR OF CERVIX: ICD-10-CM

## 2024-12-18 DIAGNOSIS — N94.6 DYSMENORRHEA: Primary | ICD-10-CM

## 2024-12-18 PROCEDURE — 99213 OFFICE O/P EST LOW 20 MIN: CPT | Performed by: OBSTETRICS & GYNECOLOGY

## 2024-12-18 PROCEDURE — G2211 COMPLEX E/M VISIT ADD ON: HCPCS | Mod: 22 | Performed by: OBSTETRICS & GYNECOLOGY

## 2024-12-18 NOTE — PROGRESS NOTES
SUBJECTIVE:   CC: dysmenorrhea                                               Kim Tang is a 24 year old  female who presents to clinic today for follow up of dysmenorrhea. She started ortho-cyclen 10/30/24 but only used it for a few days as she is opposed to taking medication. We discussed options for patch or ring, but she is undecided.   We again discussed option for diagnostic laparoscopy for dysmenorrhea, she is interested but would like to delay for a year or 2.    She would also like to discuss her pap results.     Problem list and histories reviewed & adjusted, as indicated.  Additional history: as documented.       Current Outpatient Medications   Medication Sig Dispense Refill    norgestimate-ethinyl estradiol (ORTHO-CYCLEN) 0.25-35 MG-MCG tablet Take 1 tablet by mouth daily. 84 tablet 4     No current facility-administered medications for this visit.     No Known Allergies    OBJECTIVE:   /66   Wt 46.2 kg (101 lb 14.4 oz)   LMP 12/15/2024   BMI 19.90 kg/m     Const: sitting in chair in no acute distress, comfortable  Pulm: no increased work of breathing, no cough  Psych: mood stable, appropriate affect  Neuro: A+Ox3     ASSESSMENT/PLAN:                                                    Kim Tang is a 24 year old female  here for follow up dysmenorrhea and LSIL pap    1. Dysmenorrhea (Primary)  - Reviewed causes of dysmenorrhea, including prostaglandin mediated cramping, structural abnormalities and endometriosis. Reviewed US, no structural abnormalities.   She is not interested in NSAIDs or hormonal contraception at this time.   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   - will consider nuvaring vs orthoevra vs returning to ortho-cyclen.  - also discussed acupuncture, exercise, and heating pads as alternatives for management of pain    2. LGSIL on Pap smear of cervix  She has been made aware  of the role of HPV, the natural history of infection, ways to minimize her future risk, the effect of HPV on the cervix, and treatment options available should they be indicated.   - I encouraged her to review prior medical records to determine if she has received the HPV vaccine, and if not encouraged her to do so.    Ok to follow up via video visit. Would like to consider options for dysmenorrhea and discuss again in the future.     Jamila Sparks MD  Obstetrics and Gynecology   [facility] [place]

## 2024-12-18 NOTE — NURSING NOTE
Chief Complaint   Patient presents with    Follow Up     For dysmenorrhea. Patient was last seen 10/30/2024. Reports pain has resolved. No concerns today       Initial /66   Wt 46.2 kg (101 lb 14.4 oz)   LMP 12/15/2024   BMI 19.90 kg/m   Estimated body mass index is 19.9 kg/m  as calculated from the following:    Height as of 10/30/24: 1.524 m (5').    Weight as of this encounter: 46.2 kg (101 lb 14.4 oz).  BP completed using cuff size: regular    Questioned patient about current smoking habits.  Pt. has never smoked.          The following HM Due: NONE    Ravi Tijerina CMA

## 2025-03-25 ENCOUNTER — OFFICE VISIT (OUTPATIENT)
Dept: FAMILY MEDICINE | Facility: CLINIC | Age: 25
End: 2025-03-25
Payer: COMMERCIAL

## 2025-03-25 VITALS
DIASTOLIC BLOOD PRESSURE: 70 MMHG | HEIGHT: 60 IN | WEIGHT: 103 LBS | RESPIRATION RATE: 16 BRPM | OXYGEN SATURATION: 99 % | BODY MASS INDEX: 20.22 KG/M2 | TEMPERATURE: 97.6 F | HEART RATE: 69 BPM | SYSTOLIC BLOOD PRESSURE: 106 MMHG

## 2025-03-25 DIAGNOSIS — Z00.00 ROUTINE GENERAL MEDICAL EXAMINATION AT A HEALTH CARE FACILITY: Primary | ICD-10-CM

## 2025-03-25 DIAGNOSIS — Z13.220 SCREENING FOR LIPOID DISORDERS: ICD-10-CM

## 2025-03-25 PROCEDURE — 90651 9VHPV VACCINE 2/3 DOSE IM: CPT | Performed by: INTERNAL MEDICINE

## 2025-03-25 PROCEDURE — 3074F SYST BP LT 130 MM HG: CPT | Performed by: INTERNAL MEDICINE

## 2025-03-25 PROCEDURE — 90472 IMMUNIZATION ADMIN EACH ADD: CPT | Performed by: INTERNAL MEDICINE

## 2025-03-25 PROCEDURE — 3078F DIAST BP <80 MM HG: CPT | Performed by: INTERNAL MEDICINE

## 2025-03-25 PROCEDURE — 1126F AMNT PAIN NOTED NONE PRSNT: CPT | Performed by: INTERNAL MEDICINE

## 2025-03-25 PROCEDURE — 36415 COLL VENOUS BLD VENIPUNCTURE: CPT | Performed by: INTERNAL MEDICINE

## 2025-03-25 PROCEDURE — 90471 IMMUNIZATION ADMIN: CPT | Performed by: INTERNAL MEDICINE

## 2025-03-25 PROCEDURE — 90715 TDAP VACCINE 7 YRS/> IM: CPT | Performed by: INTERNAL MEDICINE

## 2025-03-25 PROCEDURE — 80061 LIPID PANEL: CPT | Performed by: INTERNAL MEDICINE

## 2025-03-25 PROCEDURE — 99385 PREV VISIT NEW AGE 18-39: CPT | Mod: 25 | Performed by: INTERNAL MEDICINE

## 2025-03-25 SDOH — HEALTH STABILITY: PHYSICAL HEALTH: ON AVERAGE, HOW MANY DAYS PER WEEK DO YOU ENGAGE IN MODERATE TO STRENUOUS EXERCISE (LIKE A BRISK WALK)?: 7 DAYS

## 2025-03-25 ASSESSMENT — PAIN SCALES - GENERAL: PAINLEVEL_OUTOF10: NO PAIN (0)

## 2025-03-25 ASSESSMENT — SOCIAL DETERMINANTS OF HEALTH (SDOH): HOW OFTEN DO YOU GET TOGETHER WITH FRIENDS OR RELATIVES?: THREE TIMES A WEEK

## 2025-03-25 NOTE — PROGRESS NOTES
Preventive Care Visit  [facility]  Dwight Ortiz MD, Internal Medicine  Mar 25, 2025      Assessment & Plan     (Z00.00) Routine general medical examination at a health care facility  (primary encounter diagnosis)  Comment: Preventative physical.  Doing well.  Has a history of a abnormal Pap smear.  Plan: Recommended follow-up Pap smear at the end of the year as scheduled    (Z13.220) Screening for lipoid disorders  Comment: Routine screening  Plan: Lipid panel                     Counseling  Appropriate preventive services were addressed with this patient via screening, questionnaire, or discussion as appropriate for fall prevention, nutrition, physical activity, Tobacco-use cessation, social engagement, weight loss and cognition.  Checklist reviewing preventive services available has been given to the patient.  Reviewed patient's diet, addressing concerns and/or questions.   The patient was instructed to see the dentist every 6 months.       FUTURE APPOINTMENTS:       - Follow-up visit in follow-up at the end of the year for Pap smear    Rohith Alvarez is a 24 year old, presenting for the following:  Physical        3/25/2025    10:09 AM   Additional Questions   Roomed by Kayley ZURITA   Accompanied by Self          HPI  24-year-old female presents for preventative physical.  She has a history of a low-grade squamous intraepithelial lesion on her last Pap smear.  She is post have another Pap smear at the end of this year.  She is otherwise doing well.  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 Excellent   Feel stress (tense, anxious, or unable to sleep) Not at all         3/25/2025   Nutrition   Three or more servings of calcium each day? Yes   Diet: Regular (no  restrictions)   How many servings of fruit and vegetables per day? (!) 2-3   How many sweetened beverages each day? 0-1         3/25/2025   Exercise   Days per week of moderate/strenous exercise 7 days         3/25/2025   Social Factors   Frequency of gathering with friends or relatives Three times a week   Worry food won't last until get money to buy more No   Food not last or not have enough money for food? No   Do you have housing? (Housing is defined as stable permanent housing and does not include staying ouside in a car, in a tent, in an abandoned building, in an overnight shelter, or couch-surfing.) Yes   Are you worried about losing your housing? No   Lack of transportation? No   Unable to get utilities (heat,electricity)? No         3/25/2025   Dental   Dentist two times every year? (!) NO           Today's PHQ-2 Score:       3/25/2025    10:09 AM   PHQ-2 ( 1999 [employer])   Q1: Little interest or pleasure in doing things 0   Q2: Feeling down, depressed or hopeless 0   PHQ-2 Score 0    Q1: Little interest or pleasure in doing things Not at all   Q2: Feeling down, depressed or hopeless Not at all   PHQ-2 Score 0       Patient-reported           3/25/2025   Substance Use   Alcohol more than 3/day or more than 7/wk No   Do you use any other substances recreationally? No     Social History     Tobacco Use    Smoking status: Never    Smokeless tobacco: Never   Substance Use Topics    Alcohol use: Yes    Drug use: Never             3/25/2025   One time HIV Screening   Previous HIV test? Decline         3/25/2025   STI Screening   New sexual partner(s) since last STI/HIV test? (!) DECLINE     History of abnormal Pap smear: YES - reflected in Problem List and Health Maintenance accordingly        10/30/2024     1:46 PM   PAP / HPV   PAP Low-grade squamous intraepithelial lesion (LSIL) encompassing HPV/mild dysplasia/CIN1            3/25/2025   Contraception/Family Planning   Questions about contraception or family  planning No        Reviewed and updated as needed this visit by Provider                    Past Medical History:   Diagnosis Date    No pertinent past medical history      Past Surgical History:   Procedure Laterality Date    [place] Teeth       No current outpatient medications on file.     No Known Allergies      Review of Systems  Constitutional, HEENT, cardiovascular, pulmonary, GI, , musculoskeletal, neuro, skin, endocrine and psych systems are negative, except as otherwise noted.     Objective    Exam  There were no vitals taken for this visit.   Estimated body mass index is 19.9 kg/m  as calculated from the following:    Height as of 10/30/24: 1.524 m (5').    Weight as of 12/18/24: 46.2 kg (101 lb 14.4 oz).    Physical Exam  GENERAL: alert and no distress  EYES: Eyes grossly normal to inspection, PERRL and conjunctivae and sclerae normal  HENT: ear canals and TM's normal, nose and mouth without ulcers or lesions  NECK: no adenopathy, no asymmetry, masses, or scars  RESP: lungs clear to auscultation - no rales, rhonchi or wheezes  CV: regular rate and rhythm, normal S1 S2, no S3 or S4, no murmur, click or rub, no peripheral edema  ABDOMEN: soft, nontender, no hepatosplenomegaly, no masses and bowel sounds normal  MS: no gross musculoskeletal defects noted, no edema  SKIN: no suspicious lesions or rashes  NEURO: Normal strength and tone, mentation intact and speech normal  PSYCH: mentation appears normal, affect normal/bright        Signed Electronically by: Dwight Ortiz MD

## 2025-03-25 NOTE — PATIENT INSTRUCTIONS
Patient Education   Preventive Care Advice   This is general advice given by our system to help you stay healthy. However, your care team may have specific advice just for you. Please talk to your care team about your preventive care needs.  Nutrition  Eat 5 or more servings of fruits and vegetables each day.  Try wheat bread, brown rice and whole grain pasta (instead of white bread, rice, and pasta).  Get enough calcium and vitamin D. Check the label on foods and aim for 100% of the RDA (recommended daily allowance).  Lifestyle  Exercise at least 150 minutes each week  (30 minutes a day, 5 days a week).  Do muscle strengthening activities 2 days a week. These help control your weight and prevent disease.  No smoking.  Wear sunscreen to prevent skin cancer.  Have a dental exam and cleaning every 6 months.  Yearly exams  See your health care team every year to talk about:  Any changes in your health.  Any medicines your care team has prescribed.  Preventive care, family planning, and ways to prevent chronic diseases.  Shots (vaccines)   HPV shots (up to age 26), if you've never had them before.  Hepatitis B shots (up to age 59), if you've never had them before.  COVID-19 shot: Get this shot when it's due.  Flu shot: Get a flu shot every year.  Tetanus shot: Get a tetanus shot every 10 years.  Pneumococcal, hepatitis A, and RSV shots: Ask your care team if you need these based on your risk.  Shingles shot (for age 50 and up)  General health tests  Diabetes screening:  Starting at age 35, Get screened for diabetes at least every 3 years.  If you are younger than age 35, ask your care team if you should be screened for diabetes.  Cholesterol test: At age 39, start having a cholesterol test every 5 years, or more often if advised.  Bone density scan (DEXA): At age 50, ask your care team if you should have this scan for osteoporosis (brittle bones).  Hepatitis C: Get tested at least once in your life.  STIs (sexually  transmitted infections)  Before age 24: Ask your care team if you should be screened for STIs.  After age 24: Get screened for STIs if you're at risk. You are at risk for STIs (including HIV) if:  You are sexually active with more than one person.  You don't use condoms every time.  You or a partner was diagnosed with a sexually transmitted infection.  If you are at risk for HIV, ask about PrEP medicine to prevent HIV.  Get tested for HIV at least once in your life, whether you are at risk for HIV or not.  Cancer screening tests  Cervical cancer screening: If you have a cervix, begin getting regular cervical cancer screening tests starting at age 21.  Breast cancer scan (mammogram): If you've ever had breasts, begin having regular mammograms starting at age 40. This is a scan to check for breast cancer.  Colon cancer screening: It is important to start screening for colon cancer at age 45.  Have a colonoscopy test every 10 years (or more often if you're at risk) Or, ask your provider about stool tests like a FIT test every year or Cologuard test every 3 years.  To learn more about your testing options, visit:   .  For help making a decision, visit:   https://bit.ly/fm34838.  Prostate cancer screening test: If you have a prostate, ask your care team if a prostate cancer screening test (PSA) at age 55 is right for you.  Lung cancer screening: If you are a current or former smoker ages 50 to 80, ask your care team if ongoing lung cancer screenings are right for you.  For informational purposes only. Not to replace the advice of your health care provider. Copyright   2023 [employer]. All rights reserved. Clinically reviewed by the [facility] Transitions Program. [employer] [id number] - REV 01/24.

## 2025-03-26 LAB
CHOLEST SERPL-MCNC: 148 MG/DL
FASTING STATUS PATIENT QL REPORTED: YES
HDLC SERPL-MCNC: 72 MG/DL
LDLC SERPL CALC-MCNC: 69 MG/DL
NONHDLC SERPL-MCNC: 76 MG/DL
TRIGL SERPL-MCNC: 35 MG/DL